# Patient Record
(demographics unavailable — no encounter records)

---

## 2025-01-10 NOTE — REASON FOR VISIT
[TextEntry] : Roberto Ye is 3 years old male with history of chronic croupy cough and recurrent wheezing. The patient is here for an initial evaluation. The history was obtained from the mother.

## 2025-01-10 NOTE — ASSESSMENT
[FreeTextEntry1] : Roberto eY is 3 years old male with history of chronic croupy cough and recurrent wheezing.  From pulmonary perspective, he seems to have mild persistent asthma and is better for the last couple of months since he is on inhaled steroids. The croupy nature of the cough raises concern for anatomical lower airway abnormalities however less likely and therefore hold off further evaluation at this time.   From sleep perspective, he sleeps well throughout the night. There is no history of snoring, restless sleep, insomnia or parasomnia.   Plan: 1. Continue albuterol 2 puffs every 4 hours as needed for cough wheezing and shortness of breath. 2. Continue Fluticasone 44 mcg 2 puffs twice daily every day. 3. Always use spacer with inhaler. MDI/spacer teaching was provided in the clinic.  4. I will see the patient in 3 months for routine follow up, sooner if needed.

## 2025-01-10 NOTE — RESULTS/DATA
[TextEntry] : Chest x-ray 04/04/2024: The heart size is normal. An azygos fissure is seen as a normal variant. The lungs are clear without focal consolidations. Hyperinflated lungs. There is no pneumothorax or pleural effusion.  US CHest 04/05/2024: IMPRESSION: There is no evidence of right or left pleural effusion. No significant consolidation noted

## 2025-01-10 NOTE — HISTORY OF PRESENT ILLNESS
[FreeTextEntry1] : Roberto has history of chronic croupy cough for last 2 years. The cough is dry, is present both during day and night however more pronounced at night and in the early morning. The cough is aggravated with the physical activity and does not improve with albuterol. The patient also has history of recurrent wheezing associated with viral URI and occasionally with the physical activity. There is also history of out of proportion shortness of breath associated with physical activity and sometimes wakes him up in the middle of the night. The shortness of breath is relieved with albuterol.  Roberto has had 3 ER visits for croupy cough, wheezing and respiratory distress. He was hospitalized once in 2024 for HMNV. He was hypoxemic and was treated with supplemental oxygen as well in addition to bronchodilators and steroids. He had multiple unscheduled visits with PCP for breathing issues. He has had 3 courses of oral steroids and last ERV and steroid course was in 2024. He is on Fluticasone 44 mcg 2 puffs twice daily every day for last couple of months with improvement in wheezing however he has persistent croupy cough.   There is no history of feeding difficulties including cough or choking with feeding. There is no history of noisy breathing. There is no history of recurrent pneumonias.  ALLERGY SYMPTOMS: There is no history of nasal and ocular allergy symptoms. There is no history of itchy or watery eyes, itchy or watery nose, and chronic nasal congestion. The patient does not use any antiallergic and/or nasal steroids.  SLEEP SYMPTOMS: There is no history of loud snoring, respiratory pauses during sleep, restless sleep, sleep onset and maintenance insomnia. The patient did not have a sleep study.  OTHER SYMPTOMS: The patient is feeding, growing and developing well. There is no history of reflux/GERD. There is no history of, FTT, developmental delay, seizure or neurological disorder.  NEONATOLOGY HISTORY: The patient was born at 40 weeks via  for fetal distress. There were no complications during pregnancy and after delivery.  PAST MEDICAL & SURGICAL HISTROY: There is no history of adenotonsillectomy or any other surgery.  FAMILY HISTORY: There is family history of childhood asthma, no history of eczema and severe allergies. There is no family history of JAMILA, RLS and narcolepsy.  SOCIAL HISTORY: The patient lives with the parents. There is smoke exposure (he smokes outside). There is no pet exposure at home.